# Patient Record
Sex: MALE | Race: WHITE | NOT HISPANIC OR LATINO | Employment: FULL TIME | URBAN - METROPOLITAN AREA
[De-identification: names, ages, dates, MRNs, and addresses within clinical notes are randomized per-mention and may not be internally consistent; named-entity substitution may affect disease eponyms.]

---

## 2017-04-10 ENCOUNTER — OFFICE VISIT (OUTPATIENT)
Dept: OPTOMETRY | Facility: CLINIC | Age: 57
End: 2017-04-10
Payer: COMMERCIAL

## 2017-04-10 DIAGNOSIS — Z96.1 PSEUDOPHAKIA OF LEFT EYE: ICD-10-CM

## 2017-04-10 DIAGNOSIS — H52.4 PRESBYOPIA: ICD-10-CM

## 2017-04-10 DIAGNOSIS — H52.203 ASTIGMATISM OF BOTH EYES: Primary | ICD-10-CM

## 2017-04-10 DIAGNOSIS — Z98.890 HX OF LASIK: ICD-10-CM

## 2017-04-10 DIAGNOSIS — H04.123 DRY EYES: ICD-10-CM

## 2017-04-10 PROCEDURE — 92015 DETERMINE REFRACTIVE STATE: CPT | Performed by: OPTOMETRIST

## 2017-04-10 PROCEDURE — 92014 COMPRE OPH EXAM EST PT 1/>: CPT | Performed by: OPTOMETRIST

## 2017-04-10 ASSESSMENT — REFRACTION_MANIFEST
OD_CYLINDER: +1.25
OD_AXIS: 005
OS_SPHERE: -2.50
OS_CYLINDER: +1.25
OD_CYLINDER: +1.25
METHOD_AUTOREFRACTION: 1
OS_CYLINDER: +1.00
OD_ADD: +2.00
OS_AXIS: 160
OS_ADD: +2.00
OS_AXIS: 170
OD_SPHERE: -1.00
OD_AXIS: 005
OS_SPHERE: -2.75
OD_SPHERE: -0.75

## 2017-04-10 ASSESSMENT — TONOMETRY
IOP_METHOD: APPLANATION
OD_IOP_MMHG: 14
OS_IOP_MMHG: 16

## 2017-04-10 ASSESSMENT — REFRACTION_WEARINGRX
SPECS_TYPE: SVL
OS_CYLINDER: +1.00
OS_SPHERE: -2.75
OD_SPHERE: -0.50
OD_AXIS: 023
OS_AXIS: 152
OD_CYLINDER: +0.75

## 2017-04-10 ASSESSMENT — VISUAL ACUITY
OS_SC: 20/100
OS_SC: 20/20
METHOD: SNELLEN - LINEAR
OD_SC+: +2
OD_SC: 20/50-1
OD_SC: 20/30

## 2017-04-10 ASSESSMENT — EXTERNAL EXAM - RIGHT EYE: OD_EXAM: PROLAPSED FAT PADS: UPPER

## 2017-04-10 ASSESSMENT — CUP TO DISC RATIO
OS_RATIO: 0.25
OD_RATIO: 0.2

## 2017-04-10 ASSESSMENT — EXTERNAL EXAM - LEFT EYE: OS_EXAM: PROLAPSED FAT PADS: UPPER

## 2017-04-10 ASSESSMENT — SLIT LAMP EXAM - LIDS
COMMENTS: NORMAL
COMMENTS: NORMAL

## 2017-04-10 ASSESSMENT — CONF VISUAL FIELD
OS_NORMAL: 1
OD_NORMAL: 1

## 2017-04-10 NOTE — MR AVS SNAPSHOT
"              After Visit Summary   4/10/2017    Bossman Cherry    MRN: [id number]           Patient Information     Date Of Birth          1960        Visit Information        Provider Department      4/10/2017 4:30 PM Nanda Hutchinson, [facility]        Today's Diagnoses     Astigmatism of both eyes    -  1    Presbyopia        Pseudophakia of left eye        Hx of LASIK, right eye          Care Instructions    Patient was advised of today's exam findings.  Fill glasses prescription  Allow 2 weeks to adapt to change in glasses  Use artificial tears twice a day Blink Tears , Systane Ultra or Refresh Optive   Return in 1 year for eye exam    Nanda Hutchinson O.D.  [facility]   [address] [place] [address]  [phone]          Follow-ups after your visit        Who to contact     If you have questions or need follow up information about today's clinic visit or your schedule please contact [facility] directly at [phone].  Normal or non-critical lab and imaging results will be communicated to you by [employer], letter or phone within 4 business days after the clinic has received the results. If you do not hear from us within 7 days, please contact the clinic through [employer] or phone. If you have a critical or abnormal lab result, we will notify you by phone as soon as possible.  Submit refill requests through cVidya or call your pharmacy and they will forward the refill request to us. Please allow 3 business days for your refill to be completed.          Additional Information About Your Visit        Trading [employer] Information     cVidya lets you send messages to your doctor, view your test results, renew your prescriptions, schedule appointments and more. To sign up, go to [email or web address] . Click on \"Log in\" on the left side of the screen, which will take you to the Welcome page. Then click on \"Sign up Now\" on the right side of the page.     You will be " asked to enter the access code listed below, as well as some personal information. Please follow the directions to create your username and password.     Your access code is: [email or web address]  Expires: 2017  5:12 PM     Your access code will  in 90 days. If you need help or a new code, please call your [facility] clinic or [phone].        Care EveryWhere ID     This is your Care EveryWhere ID. This could be used by other organizations to access your [facility] medical records  [id number]         Blood Pressure from Last 3 Encounters:   05/18/15 129/85    Weight from Last 3 Encounters:   No data found for Wt              Today, you had the following     No orders found for display       Primary Care Provider    Md Other Clinic                Thank you!     [facility] you for [facility]  [facility]. Our goal is always to provide you with excellent care. Hearing back from our patients is one way we can continue to improve our services. Please take a few minutes to complete the written survey that you may receive in the mail after your visit with us. Thank you!             Your Updated Medication List - Protect others around you: Learn how to safely use, store and throw away your medicines at www.disposemymeds.org.          This list is accurate as of: 4/10/17  5:20 PM.  Always use your most recent med list.                   Brand Name Dispense Instructions for use    aspirin 81 MG chewable tablet      Take 81 mg by mouth daily. Takes 1 daily       ibuprofen 200 MG capsule      Take 200 mg by mouth every 4 hours as needed. Takes 6 daily       LISINOPRIL PO      Take 5 mg by mouth daily       MULTIVITAMIN/IRON PO      Take  by mouth. Takes 1 daily

## 2017-04-10 NOTE — PROGRESS NOTES
Chief Complaint   Patient presents with     COMPREHENSIVE EYE EXAM         Last Eye Exam: 06/29/2015  Dilated Previously: Yes    What are you currently using to see?  Distance only glasses       Distance Vision Acuity: Satisfied with vision    Near Vision Acuity: Not satisfied when wearing current glasses, can't see computer     Eye Comfort: watery  Do you use eye drops? : No  Occupation or Hobbies: Driving     Ad Infuse  4/10/2017          Medical, surgical and family histories reviewed and updated 4/10/2017.     Had LASIK right eye for monovision, then implant left eye for near- noticed needs clarice bright day to see well, harder to see on hazy days    OBJECTIVE: See Ophthalmology exam    ASSESSMENT:    ICD-10-CM    1. Astigmatism of both eyes H52.203 EYE EXAM (SIMPLE-NONBILLABLE)     REFRACTION   2. Presbyopia H52.4 EYE EXAM (SIMPLE-NONBILLABLE)     REFRACTION   3. Pseudophakia of left eye Z96.1 EYE EXAM (SIMPLE-NONBILLABLE)     REFRACTION   4. Hx of LASIK, right eye Z98.890 EYE EXAM (SIMPLE-NONBILLABLE)     REFRACTION   5. Dry eyes H04.123 EYE EXAM (SIMPLE-NONBILLABLE)     REFRACTION     polyethylene glycol 400 (BLINK TEARS) 0.25 % SOLN ophthalmic solution      PLAN:     Patient Instructions   Patient was advised of today's exam findings.  Fill glasses prescription  Allow 2 weeks to adapt to change in glasses  Use artificial tears twice a day Blink Tears , Systane Ultra or Refresh Optive   Return in 1 year for eye exam    Nanda Hutchinson O.D.  [facility]   [address] [place] [address] [phone]

## 2017-04-10 NOTE — PATIENT INSTRUCTIONS
Patient was advised of today's exam findings.  Fill glasses prescription  Allow 2 weeks to adapt to change in glasses  Use artificial tears twice a day Blink Tears , Systane Ultra or Refresh Optive   Return in 1 year for eye exam    Nanda Hutchinson O.D.  [facility]   [address] [place] [address] [phone]

## 2019-01-02 ENCOUNTER — DOCUMENTATION ONLY (OUTPATIENT)
Dept: OPHTHALMOLOGY | Facility: CLINIC | Age: 59
End: 2019-01-02

## 2019-06-25 ENCOUNTER — OFFICE VISIT (OUTPATIENT)
Dept: OPHTHALMOLOGY | Facility: CLINIC | Age: 59
End: 2019-06-25
Payer: COMMERCIAL

## 2019-06-25 DIAGNOSIS — H52.223 REGULAR ASTIGMATISM OF BOTH EYES: ICD-10-CM

## 2019-06-25 DIAGNOSIS — H25.11 NUCLEAR SCLEROTIC CATARACT OF RIGHT EYE: ICD-10-CM

## 2019-06-25 DIAGNOSIS — H52.12 MYOPIA OF LEFT EYE: ICD-10-CM

## 2019-06-25 DIAGNOSIS — H52.4 PRESBYOPIA: ICD-10-CM

## 2019-06-25 DIAGNOSIS — Z96.1 PSEUDOPHAKIA OF LEFT EYE: Primary | ICD-10-CM

## 2019-06-25 DIAGNOSIS — H26.492 LEFT POSTERIOR CAPSULAR OPACIFICATION: ICD-10-CM

## 2019-06-25 DIAGNOSIS — Z98.890 HX OF LASIK: ICD-10-CM

## 2019-06-25 PROCEDURE — 92004 COMPRE OPH EXAM NEW PT 1/>: CPT | Performed by: STUDENT IN AN ORGANIZED HEALTH CARE EDUCATION/TRAINING PROGRAM

## 2019-06-25 PROCEDURE — 92015 DETERMINE REFRACTIVE STATE: CPT | Performed by: STUDENT IN AN ORGANIZED HEALTH CARE EDUCATION/TRAINING PROGRAM

## 2019-06-25 ASSESSMENT — REFRACTION_MANIFEST
OD_AXIS: 003
OS_ADD: +2.50
OD_SPHERE: -0.50
OS_AXIS: 158
OS_CYLINDER: +1.00
OS_SPHERE: -2.50
OD_ADD: +2.50
OD_CYLINDER: +1.00

## 2019-06-25 ASSESSMENT — VISUAL ACUITY
OD_SC: 20/40
OS_SC: 20/125
CORRECTION_TYPE: GLASSES
METHOD: SNELLEN - LINEAR
OS_PH_SC+: -1
OS_CC+: -2
OD_SC+: -1
OS_PH_SC: 20/30
OD_PH_SC: 20/25
OD_CC: 20/40
OD_CC+: -1
OS_CC: 20/30
OD_PH_SC+: -2

## 2019-06-25 ASSESSMENT — EXTERNAL EXAM - RIGHT EYE: OD_EXAM: PROLAPSED FAT PADS: UPPER

## 2019-06-25 ASSESSMENT — REFRACTION_WEARINGRX
OD_AXIS: 023
OS_SPHERE: -2.75
OS_AXIS: 152
OD_CYLINDER: +0.75
SPECS_TYPE: SVL
OS_CYLINDER: +1.00
OD_SPHERE: -0.50

## 2019-06-25 ASSESSMENT — TONOMETRY
OD_IOP_MMHG: 15
IOP_METHOD: APPLANATION
OS_IOP_MMHG: 17

## 2019-06-25 ASSESSMENT — CONF VISUAL FIELD
METHOD: COUNTING FINGERS
OD_NORMAL: 1
OS_NORMAL: 1

## 2019-06-25 ASSESSMENT — SLIT LAMP EXAM - LIDS
COMMENTS: 2+ DERMATOCHALASIS
COMMENTS: 2+ DERMATOCHALASIS

## 2019-06-25 ASSESSMENT — CUP TO DISC RATIO
OD_RATIO: 0.2
OS_RATIO: 0.25

## 2019-06-25 ASSESSMENT — EXTERNAL EXAM - LEFT EYE: OS_EXAM: PROLAPSED FAT PADS: UPPER

## 2019-06-25 NOTE — LETTER
6/25/2019         RE: Bossman Cherry  [address] [place] [id number]        Dear Colleague,    Thank you for referring your patient, Bossman Cherry, to the [facility]. Please see a copy of my visit note below.     Current Eye Medications:  none     Subjective:  Complete eye exam. Vision is OK both eyes, but having little trouble seeing small print in distance for last year. Trouble seeing ticker on bottom of TV. No eye pain or discomfort in either eye.      Objective:  See Ophthalmology Exam.      Assessment:  Bossman Cherry is a 59 year old male who presents with:   Encounter Diagnoses   Name Primary?     Pseudophakia of left eye       Posterior capsular opacification left eye Discussed likely need for YAG laser in the future.      Hx of LASIK, right eye      Nuclear sclerotic cataract of right eye Not Visually significant      Plan:  Glasses prescription given     Donny Ramos MD  [phone]               Again, thank you for allowing me to participate in the care of your patient.        Sincerely,        Donny Ramos MD

## 2021-02-25 ENCOUNTER — OFFICE VISIT (OUTPATIENT)
Dept: OPTOMETRY | Facility: CLINIC | Age: 61
End: 2021-02-25
Payer: COMMERCIAL

## 2021-02-25 DIAGNOSIS — H52.223 REGULAR ASTIGMATISM OF BOTH EYES: ICD-10-CM

## 2021-02-25 DIAGNOSIS — H25.811 MIXED TYPE AGE-RELATED CATARACT, RIGHT EYE: ICD-10-CM

## 2021-02-25 DIAGNOSIS — Z96.1 PSEUDOPHAKIA OF LEFT EYE: ICD-10-CM

## 2021-02-25 DIAGNOSIS — Z98.890 HX OF LASIK: ICD-10-CM

## 2021-02-25 DIAGNOSIS — E11.9 TYPE 2 DIABETES MELLITUS WITHOUT COMPLICATION, WITHOUT LONG-TERM CURRENT USE OF INSULIN (H): Primary | ICD-10-CM

## 2021-02-25 DIAGNOSIS — H52.4 PRESBYOPIA: ICD-10-CM

## 2021-02-25 PROCEDURE — 92015 DETERMINE REFRACTIVE STATE: CPT | Performed by: OPTOMETRIST

## 2021-02-25 PROCEDURE — 92004 COMPRE OPH EXAM NEW PT 1/>: CPT | Performed by: OPTOMETRIST

## 2021-02-25 RX ORDER — ATORVASTATIN CALCIUM 80 MG/1
TABLET, FILM COATED ORAL
COMMUNITY
Start: 2020-07-21

## 2021-02-25 RX ORDER — METHOCARBAMOL 750 MG/1
TABLET, FILM COATED ORAL
COMMUNITY

## 2021-02-25 RX ORDER — ORAL SEMAGLUTIDE 7 MG/1
TABLET ORAL
COMMUNITY

## 2021-02-25 RX ORDER — METFORMIN HCL 500 MG
TABLET, EXTENDED RELEASE 24 HR ORAL
COMMUNITY
Start: 2020-07-21

## 2021-02-25 ASSESSMENT — REFRACTION_MANIFEST
OS_CYLINDER: +1.00
OS_SPHERE: -2.50
OD_SPHERE: -0.50
OS_AXIS: 170
OD_AXIS: 015
OS_ADD: +1.50
OD_SPHERE: -2.25
OS_AXIS: 152
OD_AXIS: 175
METHOD_AUTOREFRACTION: 1
OS_CYLINDER: +1.00
OD_CYLINDER: +1.00
OD_ADD: +1.50
OD_CYLINDER: +1.75
OS_SPHERE: -3.25

## 2021-02-25 ASSESSMENT — REFRACTION_WEARINGRX
OS_SPHERE: -2.50
OS_CYLINDER: +1.00
OD_CYLINDER: +1.00
OS_AXIS: 158
OD_AXIS: 003
OD_SPHERE: -0.50

## 2021-02-25 ASSESSMENT — SLIT LAMP EXAM - LIDS
COMMENTS: DCH- EYELID SKIN RESTING ON EYELASHES
COMMENTS: DCH- EYELID SKIN RESTING ON EYELASHES

## 2021-02-25 ASSESSMENT — VISUAL ACUITY
OD_SC+: -1
OD_SC: 20/100
OS_SC: 20/100
OD_SC: 20/40
METHOD: SNELLEN - LINEAR
OS_SC: 20/20

## 2021-02-25 ASSESSMENT — EXTERNAL EXAM - LEFT EYE: OS_EXAM: PROLAPSED FAT PADS: UPPER

## 2021-02-25 ASSESSMENT — EXTERNAL EXAM - RIGHT EYE: OD_EXAM: PROLAPSED FAT PADS: UPPER

## 2021-02-25 ASSESSMENT — CUP TO DISC RATIO
OD_RATIO: 0.20
OS_RATIO: 0.25

## 2021-02-25 ASSESSMENT — TONOMETRY
OS_IOP_MMHG: 14
OD_IOP_MMHG: 14
IOP_METHOD: APPLANATION

## 2021-02-25 ASSESSMENT — CONF VISUAL FIELD
OD_NORMAL: 1
OS_NORMAL: 1

## 2021-02-25 NOTE — LETTER
2/25/2021         RE: Bossman Cherry  [address] [place] [address]        Dear Colleague,    Thank you for referring your patient, Bossman Cherry, to the [facility]. No diabetic retinopathy was found at eye exam.     Again, thank you for allowing me to participate in the care of your patient.        Sincerely,        Nanda Hutchinson, OD

## 2021-02-25 NOTE — PROGRESS NOTES
Chief Complaint   Patient presents with     Diabetic Eye Exam     A1C  7.1 last week    Last Eye Exam: 6-  Dilated Previously: Yes    What are you currently using to see?  Glasses rx sunglasses    Distance Vision Acuity: Noticed gradual change in right eye    Near Vision Acuity: Satisfied with vision while reading  unaided    Eye Comfort: good  Do you use eye drops? : No  Occupation or Hobbies: [employer]     Medical, surgical and family histories reviewed and updated 2/25/2021.       OBJECTIVE: See Ophthalmology exam    ASSESSMENT:    ICD-10-CM    1. Regular astigmatism of both eyes  H52.223    2. Presbyopia  H52.4    3. Pseudophakia of left eye  Z96.1    4. Hx of LASIK, right eye  Z98.890       PLAN:    Bossman Cherry aware  eye exam results will be sent to [facility].  Patient Instructions   Patient was advised of today's exam findings.  Fill glasses prescription  Monitor mild cataracts in right eye   Keep blood sugar under good control  Return in 1 year for diabetic eye exam      Diabetes weakens the blood vessels all over the body, including the eyes. Damage to the blood vessels in the eyes can cause swelling or bleeding into part of the eye (called the retina). This is called diabetic retinopathy (CHIDI-tin-AH-puh-thee). If not treated, this disease can cause vision loss or blindness.   Symptoms may include blurred or distorted vision, but many people have no symptoms. It's important to see your eye doctor regularly to check for problems.   Early treatment and good control can help protect your vision. Here are the things you can do to help prevent vision loss:      1. Keep your blood sugar levels under tight control.      2. Bring high blood pressure under control.      3. No smoking.      4. Have yearly dilated eye exams.      Nanda Hutchinson O.D.  [facility]   [id number] [place] [id number] [phone]

## 2021-02-25 NOTE — PATIENT INSTRUCTIONS
Patient was advised of today's exam findings.  Fill glasses prescription  Monitor mild cataracts in right eye   Keep blood sugar under good control  Return in 1 year for diabetic eye exam      Diabetes weakens the blood vessels all over the body, including the eyes. Damage to the blood vessels in the eyes can cause swelling or bleeding into part of the eye (called the retina). This is called diabetic retinopathy (CHIDI-tin-AH-puh-thee). If not treated, this disease can cause vision loss or blindness.   Symptoms may include blurred or distorted vision, but many people have no symptoms. It's important to see your eye doctor regularly to check for problems.   Early treatment and good control can help protect your vision. Here are the things you can do to help prevent vision loss:      1. Keep your blood sugar levels under tight control.      2. Bring high blood pressure under control.      3. No smoking.      4. Have yearly dilated eye exams.      Nanda Hutchinson O.D.  [facility]   [address] [place], [place] [id number] [phone]

## 2021-05-18 ENCOUNTER — TELEPHONE (OUTPATIENT)
Dept: OPHTHALMOLOGY | Facility: CLINIC | Age: 61
End: 2021-05-18

## 2021-05-18 ENCOUNTER — OFFICE VISIT (OUTPATIENT)
Dept: OPHTHALMOLOGY | Facility: CLINIC | Age: 61
End: 2021-05-18
Payer: COMMERCIAL

## 2021-05-18 DIAGNOSIS — Z96.1 PSEUDOPHAKIA OF LEFT EYE: ICD-10-CM

## 2021-05-18 DIAGNOSIS — Z98.890 HX OF LASIK: ICD-10-CM

## 2021-05-18 DIAGNOSIS — H25.11 NUCLEAR SCLEROTIC CATARACT OF RIGHT EYE: Primary | ICD-10-CM

## 2021-05-18 DIAGNOSIS — H26.492 LEFT POSTERIOR CAPSULAR OPACIFICATION: ICD-10-CM

## 2021-05-18 PROCEDURE — 92014 COMPRE OPH EXAM EST PT 1/>: CPT | Performed by: STUDENT IN AN ORGANIZED HEALTH CARE EDUCATION/TRAINING PROGRAM

## 2021-05-18 ASSESSMENT — VISUAL ACUITY
OD_BAT_HIGH: 20/40
OD_SC: 20/30-+1
METHOD: SNELLEN - LINEAR
OS_PH_SC: 20/25
OS_SC: 20/80-1

## 2021-05-18 ASSESSMENT — TONOMETRY
OD_IOP_MMHG: 19
OS_IOP_MMHG: 18
IOP_METHOD: APPLANATION

## 2021-05-18 ASSESSMENT — EXTERNAL EXAM - LEFT EYE: OS_EXAM: PROLAPSED FAT PADS: UPPER

## 2021-05-18 ASSESSMENT — REFRACTION_MANIFEST
OD_CYLINDER: +1.50
OD_SPHERE: PLANO
OD_AXIS: 180

## 2021-05-18 ASSESSMENT — EXTERNAL EXAM - RIGHT EYE: OD_EXAM: PROLAPSED FAT PADS: UPPER

## 2021-05-18 ASSESSMENT — SLIT LAMP EXAM - LIDS
COMMENTS: DCH- EYELID SKIN RESTING ON EYELASHES
COMMENTS: DCH- EYELID SKIN RESTING ON EYELASHES

## 2021-05-18 ASSESSMENT — CUP TO DISC RATIO
OS_RATIO: 0.25
OD_RATIO: 0.20

## 2021-05-18 NOTE — LETTER
5/18/2021         RE: Bossman Cherry  [address] [facility] [address]        Dear Colleague,    Thank you for referring your patient, Bossman Cherry, to the [facility]. Please see a copy of my visit note below.     Current Eye Medications:       Subjective:  Cataract evaluation right eye   Pt reports that his vision in his right eye is getting worse.  Pt has hx of Lasik right eye and is diabetic. Pseudophakic left eye corrected for monovision.  A1C 5/18/21 was 7.5.     Objective:  See Ophthalmology Exam.       Assessment:  Bossman Cherry is a 61 year old male who presents with:   Encounter Diagnoses   Name Primary?     Nuclear sclerotic cataract of right eye Visually significant right eye. Dil 8. S/p LASIK with left eye near (monovision) - will plan to target right eye mostly distance. He attempted to get the records from [employer], but they did not have them.      Hx of LASIK, right eye      Pseudophakia of left eye Had surgery in 2015 at [facility] (with me). Did well last time during surgery.      Left posterior capsular opacification      Visually significant cataract that is interfering with daily activities of living. Plan for cataract extraction and intraocular lens implant right eye.  Risks, benefits, complications, and alternatives discussed with patient including possibility of limitations from coexistent eye disease and loss of vision. Target refraction and lens options discussed.  Patient understands and wishes to proceed with surgery.     Plan:  Offered cataract surgery of the left eye at [facility] or the [facility] [place]  We will call you to schedule if you wish to proceed    Donny Ramos MD  [phone]          Again, thank you for allowing me to participate in the care of your patient.        Sincerely,        Donny Ramos MD

## 2021-05-18 NOTE — PATIENT INSTRUCTIONS
Offered cataract surgery of the left eye at [facility] or the [facility]  We will call you to schedule if you wish to proceed    Donny Ramos MD  [phone]

## 2021-05-18 NOTE — PROGRESS NOTES
Current Eye Medications:       Subjective:  Cataract evaluation right eye   Pt reports that his vision in his right eye is getting worse.  Pt has hx of Lasik right eye and is diabetic. Pseudophakic left eye corrected for monovision.  A1C 5/18/21 was 7.5.     Objective:  See Ophthalmology Exam.       Assessment:  Bossman Cherry is a 61 year old male who presents with:   Encounter Diagnoses   Name Primary?     Nuclear sclerotic cataract of right eye Visually significant right eye. Dil 8. S/p LASIK with left eye near (monovision) - will plan to target right eye mostly distance. He attempted to get the records from [employer], but they did not have them.      Hx of LASIK, right eye      Pseudophakia of left eye Had surgery in 2015 at [facility] (with me). Did well last time during surgery.      Left posterior capsular opacification      Visually significant cataract that is interfering with daily activities of living. Plan for cataract extraction and intraocular lens implant right eye.  Risks, benefits, complications, and alternatives discussed with patient including possibility of limitations from coexistent eye disease and loss of vision. Target refraction and lens options discussed.  Patient understands and wishes to proceed with surgery.     Plan:  Offered cataract surgery of the left eye at [facility] or the [facility] and [facility] [place]  We will call you to schedule if you wish to proceed    Donny Ramos MD  [phone]

## 2021-05-18 NOTE — TELEPHONE ENCOUNTER
Reason for Call:  Other     Detailed comments: Patients former Dr. torres That did Lasik called and noted that they have no records of patients lasik surgery.     Phone Number Patient can be reached at: Home number on file [phone] (home)    Best Time: any    Can we leave a detailed message on this number? YES    Call taken on 5/18/2021 at 1:01 PM by Denice Coles

## 2021-05-28 ENCOUNTER — PREP FOR PROCEDURE (OUTPATIENT)
Dept: OPHTHALMOLOGY | Facility: CLINIC | Age: 61
End: 2021-05-28

## 2021-05-28 DIAGNOSIS — H25.811 COMBINED FORM OF AGE-RELATED CATARACT, RIGHT EYE: Primary | ICD-10-CM

## 2021-06-01 ENCOUNTER — TELEPHONE (OUTPATIENT)
Dept: OPHTHALMOLOGY | Facility: CLINIC | Age: 61
End: 2021-06-01

## 2021-06-01 NOTE — TELEPHONE ENCOUNTER
No answer no voicemail. Rang and eventually stopped. Will await call back.    Notes state 8-23-21 for Surgery day.

## 2021-06-28 PROBLEM — H25.811 COMBINED FORM OF AGE-RELATED CATARACT, RIGHT EYE: Status: ACTIVE | Noted: 2021-06-28

## 2021-06-28 NOTE — TELEPHONE ENCOUNTER
Type of surgery: right phacoemulsification,cataract, with intraocular lens implant (right)  CPT 89766   Nuclear sclerotic cataract of right eye  H25.11  Location of surgery: [facility]  Date and time of surgery: 8-23-21 9:30am  Surgeon: Dr Ramos  Pre-Op Appt Date: 8-6-21 & TBD  Post-Op Appt Date: 8-24-21, 8-31-21 & 9-21-21   Packet sent out: Yes  Pre-cert/Authorization completed: No prior auth needed per [employer] online site.  Date: 6-28-21    Alyse Rojas  Prior Authorization Dept  [phone]

## 2021-06-30 DIAGNOSIS — Z11.59 ENCOUNTER FOR SCREENING FOR OTHER VIRAL DISEASES: ICD-10-CM

## 2021-07-25 ENCOUNTER — HEALTH MAINTENANCE LETTER (OUTPATIENT)
Age: 61
End: 2021-07-25

## 2021-08-03 ENCOUNTER — TELEPHONE (OUTPATIENT)
Dept: OPHTHALMOLOGY | Facility: CLINIC | Age: 61
End: 2021-08-03

## 2021-08-03 NOTE — TELEPHONE ENCOUNTER
Patient is having cataract surgery at the end of August with Dr. Ramos. He is wanting a note indicating he can not work until after his surgery. Please return phone call to discuss options available. Phone: [phone].

## 2021-08-04 NOTE — TELEPHONE ENCOUNTER
Patient states right eye feels like piece of gravel is in eye off and on. happens when focusing on stuff. Gravel feeling last for about 20 minutes, if patient uses drops and rest eye it goes away. Patient states felt exact same thing when he had cataract in left eye. Patient was using old rx drops from cataract surgery 5/18/15, explained he is not to take those and throw them away. Told him to use the artificial tears four times a day as needed. Eye is feeing better today so far, just was a bad day yesterday. Is OK with waiting until Friday to see Dr. Ramos. Told to call if anything changes or gets worse.

## 2021-08-06 ENCOUNTER — OFFICE VISIT (OUTPATIENT)
Dept: OPHTHALMOLOGY | Facility: CLINIC | Age: 61
End: 2021-08-06
Payer: COMMERCIAL

## 2021-08-06 DIAGNOSIS — H26.492 LEFT POSTERIOR CAPSULAR OPACIFICATION: ICD-10-CM

## 2021-08-06 DIAGNOSIS — Z98.890 HX OF LASIK: ICD-10-CM

## 2021-08-06 DIAGNOSIS — H25.11 NUCLEAR SCLEROTIC CATARACT OF RIGHT EYE: Primary | ICD-10-CM

## 2021-08-06 DIAGNOSIS — Z96.1 PSEUDOPHAKIA OF LEFT EYE: ICD-10-CM

## 2021-08-06 PROCEDURE — 92012 INTRM OPH EXAM EST PATIENT: CPT | Performed by: STUDENT IN AN ORGANIZED HEALTH CARE EDUCATION/TRAINING PROGRAM

## 2021-08-06 PROCEDURE — 92136 OPHTHALMIC BIOMETRY: CPT | Performed by: STUDENT IN AN ORGANIZED HEALTH CARE EDUCATION/TRAINING PROGRAM

## 2021-08-06 RX ORDER — KETOROLAC TROMETHAMINE 5 MG/ML
1 SOLUTION OPHTHALMIC 4 TIMES DAILY
Qty: 5 ML | Refills: 0 | Status: SHIPPED | OUTPATIENT
Start: 2021-08-22 | End: 2022-08-18

## 2021-08-06 RX ORDER — MOXIFLOXACIN 5 MG/ML
1 SOLUTION/ DROPS OPHTHALMIC 4 TIMES DAILY
Qty: 3 ML | Refills: 0 | Status: SHIPPED | OUTPATIENT
Start: 2021-08-22 | End: 2022-08-18

## 2021-08-06 RX ORDER — PREDNISOLONE ACETATE 10 MG/ML
1 SUSPENSION/ DROPS OPHTHALMIC 4 TIMES DAILY
Qty: 10 ML | Refills: 0 | Status: SHIPPED | OUTPATIENT
Start: 2021-08-22 | End: 2022-08-18

## 2021-08-06 ASSESSMENT — SLIT LAMP EXAM - LIDS
COMMENTS: DCH- EYELID SKIN RESTING ON EYELASHES
COMMENTS: DCH- EYELID SKIN RESTING ON EYELASHES

## 2021-08-06 ASSESSMENT — VISUAL ACUITY
METHOD: SNELLEN - LINEAR
OD_CC: 20/60-1

## 2021-08-06 ASSESSMENT — EXTERNAL EXAM - LEFT EYE: OS_EXAM: PROLAPSED FAT PADS: UPPER

## 2021-08-06 ASSESSMENT — TONOMETRY
IOP_METHOD: APPLANATION
OD_IOP_MMHG: 19

## 2021-08-06 ASSESSMENT — EXTERNAL EXAM - RIGHT EYE: OD_EXAM: PROLAPSED FAT PADS: UPPER

## 2021-08-06 NOTE — PATIENT INSTRUCTIONS
PRE-OP CATARACT INSTRUCTIONS    ** 3 eye drops from the pharmacy at least 3 days before surgery.    *Use the following drops in the right eye 4 times on the day before surgery and once the morning of surgery:                                 Vigamox or Ofloxacin (tan cap)   Ketorolac (gray cap)   Prednisolone (white or pink cap)    *Please bring all your eyedrops to the surgery center.    *If taking more than one drop, wait five minutes between drops.    *No solid food or drink after midnight. Please take the starred medications (see attached sheets) with a small sip of water the morning of surgery.    *If you are diabetic, please do not take any diabetic medications the morning of surgery.    Donny Ramos MD  [phone]

## 2021-08-06 NOTE — PROGRESS NOTES
Current Eye Medications:       Subjective:  preop  kpe right eye   Scheduled on 8/23/21. History of LASIK right eye - unable to obtain past records.      Objective:  See Ophthalmology Exam.       Assessment:  Bossman Cherry is a 61 year old male who presents with:   Encounter Diagnoses   Name Primary?     Nuclear sclerotic cataract of right eye Cataract pre-op right eye. Dil 8. 3 drops. S/p LASIK with left eye near (monovision). Target right eye mostly distance.     Hx of LASIK, right eye      Pseudophakia of left eye      Left posterior capsular opacification        Plan:  PRE-OP CATARACT INSTRUCTIONS    ** 3 eye drops from the pharmacy at least 3 days before surgery.    *Use the following drops in the right eye 4 times on the day before surgery and once the morning of surgery:                                 Vigamox or Ofloxacin (tan cap)   Ketorolac (gray cap)   Prednisolone (white or pink cap)    *Please bring all your eyedrops to the surgery center.    *If taking more than one drop, wait five minutes between drops.    *No solid food or drink after midnight. Please take the starred medications (see attached sheets) with a small sip of water the morning of surgery.    *If you are diabetic, please do not take any diabetic medications the morning of surgery.    Donny Ramos MD  [phone]

## 2021-08-06 NOTE — LETTER
8/6/2021         RE: Bossman Cherry  [address] [place] [address]        Dear Colleague,    Thank you for referring your patient, Bossman Cherry, to the [facility]. Please see a copy of my visit note below.     Current Eye Medications:       Subjective:  preop  kpe right eye   Scheduled on 8/23/21. History of LASIK right eye - unable to obtain past records.      Objective:  See Ophthalmology Exam.       Assessment:  Bossman Cherry is a 61 year old male who presents with:   Encounter Diagnoses   Name Primary?     Nuclear sclerotic cataract of right eye Cataract pre-op right eye. Dil 8. 3 drops. S/p LASIK with left eye near (monovision). Target right eye mostly distance.     Hx of LASIK, right eye      Pseudophakia of left eye      Left posterior capsular opacification        Plan:  PRE-OP CATARACT INSTRUCTIONS    ** 3 eye drops from the pharmacy at least 3 days before surgery.    *Use the following drops in the right eye 4 times on the day before surgery and once the morning of surgery:                                 Vigamox or Ofloxacin (tan cap)   Ketorolac (gray cap)   Prednisolone (white or pink cap)    *Please bring all your eyedrops to the surgery center.    *If taking more than one drop, wait five minutes between drops.    *No solid food or drink after midnight. Please take the starred medications (see attached sheets) with a small sip of water the morning of surgery.    *If you are diabetic, please do not take any diabetic medications the morning of surgery.    Donny Ramos MD  [phone]          Again, thank you for allowing me to participate in the care of your patient.        Sincerely,        Donny Ramos MD

## 2021-08-20 ENCOUNTER — LAB (OUTPATIENT)
Dept: LAB | Facility: CLINIC | Age: 61
End: 2021-08-20
Payer: COMMERCIAL

## 2021-08-20 ENCOUNTER — ANESTHESIA EVENT (OUTPATIENT)
Dept: SURGERY | Facility: AMBULATORY SURGERY CENTER | Age: 61
End: 2021-08-20
Payer: COMMERCIAL

## 2021-08-20 DIAGNOSIS — Z11.59 ENCOUNTER FOR SCREENING FOR OTHER VIRAL DISEASES: ICD-10-CM

## 2021-08-20 PROCEDURE — U0003 INFECTIOUS AGENT DETECTION BY NUCLEIC ACID (DNA OR RNA); SEVERE ACUTE RESPIRATORY SYNDROME CORONAVIRUS 2 (SARS-COV-2) (CORONAVIRUS DISEASE [COVID-19]), AMPLIFIED PROBE TECHNIQUE, MAKING USE OF HIGH THROUGHPUT TECHNOLOGIES AS DESCRIBED BY CMS-2020-01-R: HCPCS

## 2021-08-20 PROCEDURE — U0005 INFEC AGEN DETEC AMPLI PROBE: HCPCS

## 2021-08-21 LAB — SARS-COV-2 RNA RESP QL NAA+PROBE: NEGATIVE

## 2021-08-23 ENCOUNTER — HOSPITAL ENCOUNTER (OUTPATIENT)
Facility: AMBULATORY SURGERY CENTER | Age: 61
End: 2021-08-23
Attending: STUDENT IN AN ORGANIZED HEALTH CARE EDUCATION/TRAINING PROGRAM | Admitting: STUDENT IN AN ORGANIZED HEALTH CARE EDUCATION/TRAINING PROGRAM
Payer: COMMERCIAL

## 2021-08-23 ENCOUNTER — ANESTHESIA (OUTPATIENT)
Dept: SURGERY | Facility: AMBULATORY SURGERY CENTER | Age: 61
End: 2021-08-23
Payer: COMMERCIAL

## 2021-08-23 VITALS
DIASTOLIC BLOOD PRESSURE: 70 MMHG | SYSTOLIC BLOOD PRESSURE: 112 MMHG | TEMPERATURE: 97.5 F | RESPIRATION RATE: 16 BRPM | OXYGEN SATURATION: 95 % | WEIGHT: 208 LBS

## 2021-08-23 DIAGNOSIS — H25.811 COMBINED FORM OF AGE-RELATED CATARACT, RIGHT EYE: ICD-10-CM

## 2021-08-23 LAB — GLUCOSE BLDC GLUCOMTR-MCNC: 172 MG/DL (ref 70–99)

## 2021-08-23 PROCEDURE — G8918 PT W/O PREOP ORDER IV AB PRO: HCPCS

## 2021-08-23 PROCEDURE — 66984 XCAPSL CTRC RMVL W/O ECP: CPT | Mod: RT

## 2021-08-23 PROCEDURE — G8907 PT DOC NO EVENTS ON DISCHARG: HCPCS

## 2021-08-23 PROCEDURE — 66984 XCAPSL CTRC RMVL W/O ECP: CPT | Mod: RT | Performed by: STUDENT IN AN ORGANIZED HEALTH CARE EDUCATION/TRAINING PROGRAM

## 2021-08-23 DEVICE — EYE IMP IOL AMO PCL TECNIS ZCB00 19.0: Type: IMPLANTABLE DEVICE | Site: EYE | Status: FUNCTIONAL

## 2021-08-23 RX ORDER — HYDRALAZINE HYDROCHLORIDE 20 MG/ML
2.5-5 INJECTION INTRAMUSCULAR; INTRAVENOUS EVERY 10 MIN PRN
Status: DISCONTINUED | OUTPATIENT
Start: 2021-08-23 | End: 2021-08-24 | Stop reason: HOSPADM

## 2021-08-23 RX ORDER — MEPERIDINE HYDROCHLORIDE 25 MG/ML
12.5 INJECTION INTRAMUSCULAR; INTRAVENOUS; SUBCUTANEOUS
Status: DISCONTINUED | OUTPATIENT
Start: 2021-08-23 | End: 2021-08-24 | Stop reason: HOSPADM

## 2021-08-23 RX ORDER — SODIUM CHLORIDE, SODIUM LACTATE, POTASSIUM CHLORIDE, CALCIUM CHLORIDE 600; 310; 30; 20 MG/100ML; MG/100ML; MG/100ML; MG/100ML
500 INJECTION, SOLUTION INTRAVENOUS CONTINUOUS
Status: DISCONTINUED | OUTPATIENT
Start: 2021-08-23 | End: 2021-08-24 | Stop reason: HOSPADM

## 2021-08-23 RX ORDER — ONDANSETRON 4 MG/1
4 TABLET, ORALLY DISINTEGRATING ORAL EVERY 30 MIN PRN
Status: DISCONTINUED | OUTPATIENT
Start: 2021-08-23 | End: 2021-08-24 | Stop reason: HOSPADM

## 2021-08-23 RX ORDER — PHENYLEPHRINE HYDROCHLORIDE 25 MG/ML
1 SOLUTION/ DROPS OPHTHALMIC
Status: COMPLETED | OUTPATIENT
Start: 2021-08-23 | End: 2021-08-23

## 2021-08-23 RX ORDER — ONDANSETRON 2 MG/ML
4 INJECTION INTRAMUSCULAR; INTRAVENOUS EVERY 30 MIN PRN
Status: DISCONTINUED | OUTPATIENT
Start: 2021-08-23 | End: 2021-08-24 | Stop reason: HOSPADM

## 2021-08-23 RX ORDER — SODIUM CHLORIDE, SODIUM LACTATE, POTASSIUM CHLORIDE, CALCIUM CHLORIDE 600; 310; 30; 20 MG/100ML; MG/100ML; MG/100ML; MG/100ML
INJECTION, SOLUTION INTRAVENOUS CONTINUOUS
Status: DISCONTINUED | OUTPATIENT
Start: 2021-08-23 | End: 2021-08-24 | Stop reason: HOSPADM

## 2021-08-23 RX ORDER — TETRACAINE HYDROCHLORIDE 5 MG/ML
1-2 SOLUTION OPHTHALMIC ONCE
Status: COMPLETED | OUTPATIENT
Start: 2021-08-23 | End: 2021-08-23

## 2021-08-23 RX ORDER — LIDOCAINE 40 MG/G
CREAM TOPICAL
Status: DISCONTINUED | OUTPATIENT
Start: 2021-08-23 | End: 2021-08-24 | Stop reason: HOSPADM

## 2021-08-23 RX ORDER — ACETAMINOPHEN 325 MG/1
975 TABLET ORAL ONCE
Status: COMPLETED | OUTPATIENT
Start: 2021-08-23 | End: 2021-08-23

## 2021-08-23 RX ORDER — MOXIFLOXACIN 5 MG/ML
SOLUTION/ DROPS OPHTHALMIC PRN
Status: DISCONTINUED | OUTPATIENT
Start: 2021-08-23 | End: 2021-08-23 | Stop reason: HOSPADM

## 2021-08-23 RX ORDER — CYCLOPENTOLATE HYDROCHLORIDE 10 MG/ML
1 SOLUTION/ DROPS OPHTHALMIC
Status: COMPLETED | OUTPATIENT
Start: 2021-08-23 | End: 2021-08-23

## 2021-08-23 RX ORDER — METOPROLOL TARTRATE 1 MG/ML
1-2 INJECTION, SOLUTION INTRAVENOUS EVERY 5 MIN PRN
Status: DISCONTINUED | OUTPATIENT
Start: 2021-08-23 | End: 2021-08-24 | Stop reason: HOSPADM

## 2021-08-23 RX ORDER — TROPICAMIDE 10 MG/ML
1 SOLUTION/ DROPS OPHTHALMIC
Status: COMPLETED | OUTPATIENT
Start: 2021-08-23 | End: 2021-08-23

## 2021-08-23 RX ORDER — TETRACAINE HYDROCHLORIDE 5 MG/ML
SOLUTION OPHTHALMIC PRN
Status: DISCONTINUED | OUTPATIENT
Start: 2021-08-23 | End: 2021-08-23 | Stop reason: HOSPADM

## 2021-08-23 RX ORDER — ALBUTEROL SULFATE 0.83 MG/ML
2.5 SOLUTION RESPIRATORY (INHALATION) EVERY 4 HOURS PRN
Status: DISCONTINUED | OUTPATIENT
Start: 2021-08-23 | End: 2021-08-24 | Stop reason: HOSPADM

## 2021-08-23 RX ADMIN — CYCLOPENTOLATE HYDROCHLORIDE 1 DROP: 10 SOLUTION/ DROPS OPHTHALMIC at 08:52

## 2021-08-23 RX ADMIN — TROPICAMIDE 1 DROP: 10 SOLUTION/ DROPS OPHTHALMIC at 08:52

## 2021-08-23 RX ADMIN — CYCLOPENTOLATE HYDROCHLORIDE 1 DROP: 10 SOLUTION/ DROPS OPHTHALMIC at 09:00

## 2021-08-23 RX ADMIN — TETRACAINE HYDROCHLORIDE 2 DROP: 5 SOLUTION OPHTHALMIC at 08:44

## 2021-08-23 RX ADMIN — TROPICAMIDE 1 DROP: 10 SOLUTION/ DROPS OPHTHALMIC at 08:44

## 2021-08-23 RX ADMIN — SODIUM CHLORIDE, SODIUM LACTATE, POTASSIUM CHLORIDE, CALCIUM CHLORIDE 500 ML: 600; 310; 30; 20 INJECTION, SOLUTION INTRAVENOUS at 09:00

## 2021-08-23 RX ADMIN — PHENYLEPHRINE HYDROCHLORIDE 1 DROP: 25 SOLUTION/ DROPS OPHTHALMIC at 08:44

## 2021-08-23 RX ADMIN — CYCLOPENTOLATE HYDROCHLORIDE 1 DROP: 10 SOLUTION/ DROPS OPHTHALMIC at 08:44

## 2021-08-23 RX ADMIN — TROPICAMIDE 1 DROP: 10 SOLUTION/ DROPS OPHTHALMIC at 09:00

## 2021-08-23 RX ADMIN — PHENYLEPHRINE HYDROCHLORIDE 1 DROP: 25 SOLUTION/ DROPS OPHTHALMIC at 09:00

## 2021-08-23 RX ADMIN — Medication 4 MCG: at 09:58

## 2021-08-23 RX ADMIN — ACETAMINOPHEN 975 MG: 325 TABLET ORAL at 08:46

## 2021-08-23 RX ADMIN — Medication 8 MCG: at 09:51

## 2021-08-23 RX ADMIN — PHENYLEPHRINE HYDROCHLORIDE 1 DROP: 25 SOLUTION/ DROPS OPHTHALMIC at 08:52

## 2021-08-23 NOTE — ANESTHESIA PREPROCEDURE EVALUATION
Anesthesia Pre-Procedure Evaluation    Patient: Bossman Cherry   MRN: [id number] : 1960        Preoperative Diagnosis: Combined form of age-related cataract, right eye [H25.811]   Procedure : Procedure(s):  RIGHT PHACOEMULSIFICATION, CATARACT, WITH INTRAOCULAR LENS IMPLANT     Past Medical History:   Diagnosis Date     Cataract      Diabetes (H)      Hypertension       Past Surgical History:   Procedure Laterality Date     CATARACT IOL, RT/LT Left      LASIK      Right Eye Only     PHACOEMULSIFICATION WITH STANDARD INTRAOCULAR LENS IMPLANT Left 2015    Procedure: PHACOEMULSIFICATION WITH STANDARD INTRAOCULAR LENS IMPLANT;  Surgeon: Donny Ramos MD;  Location: [facility] OR      No Known Allergies   Social History     Tobacco Use     Smoking status: Never Smoker     Smokeless tobacco: Never Used     Tobacco comment: Lives in smoke free household   Substance Use Topics     Alcohol use: Not on file      Wt Readings from Last 1 Encounters:   21 94.3 kg (208 lb)        Anesthesia Evaluation   Pt has had prior anesthetic.     No history of anesthetic complications       ROS/MED HX  ENT/Pulmonary: Comment: cataract      Neurologic:  - neg neurologic ROS     Cardiovascular:     (+) hypertension-----    METS/Exercise Tolerance: >4 METS    Hematologic:  - neg hematologic  ROS     Musculoskeletal:  - neg musculoskeletal ROS     GI/Hepatic:  - neg GI/hepatic ROS     Renal/Genitourinary:  - neg Renal ROS     Endo:     (+) type II DM,     Psychiatric/Substance Use:  - neg psychiatric ROS     Infectious Disease:  - neg infectious disease ROS     Malignancy:  - neg malignancy ROS     Other:  - neg other ROS          Physical Exam    Airway  airway exam normal      Mallampati: I   TM distance: > 3 FB   Neck ROM: full   Mouth opening: > 3 cm    Respiratory Devices and Support         Dental  no notable dental history         Cardiovascular   cardiovascular exam normal       Rhythm and rate: regular and normal      Pulmonary   pulmonary exam normal        breath sounds clear to auscultation           OUTSIDE LABS:  CBC: No results found for: WBC, HGB, HCT, PLT  BMP:   Lab Results   Component Value Date     (H) 08/23/2021     COAGS: No results found for: PTT, INR, FIBR  POC: No results found for: BGM, HCG, HCGS  HEPATIC: No results found for: ALBUMIN, PROTTOTAL, ALT, AST, GGT, ALKPHOS, BILITOTAL, BILIDIRECT, THI  OTHER: No results found for: PH, LACT, A1C, OZ, PHOS, MAG, LIPASE, AMYLASE, TSH, T4, T3, CRP, SED    Anesthesia Plan    ASA Status:  2   NPO Status:  NPO Appropriate    Anesthesia Type: MAC.     - Reason for MAC: straight local not clinically adequate   Induction: N/a.   Maintenance: N/A.        Consents    Anesthesia Plan(s) and associated risks, benefits, and realistic alternatives discussed. Questions answered and patient/representative(s) expressed understanding.     - Discussed with:  Patient    Use of blood products discussed: No .     Postoperative Care    Pain management: Multi-modal analgesia.   PONV prophylaxis: Ondansetron (or other 5HT-3)     Comments:         H&P reviewed: Unable to attach H&P to encounter due to EHR limitations. H&P Update: appropriate H&P reviewed, patient examined. No interval changes since H&P (within 30 days).         Fili Carballo DO

## 2021-08-23 NOTE — OP NOTE
PreOp Diagnosis: Visually significant nuclear sclerotic cataract right eye  PostOp Diagnosis: Same  Surgeon: Donny Ramos MD  Implant: Tecnis [id number] 19.0D    Procedures:   1. Review of intraocular lens calculations, both eyes   2. Phacoemulsification and extraction of lens  right eye   3. Intraocular lens implantation right eye  Anesthesia: MAC/topical  Complications: None  EBL: <1cc    Bossman Cehrry suffers from a visually significant cataract of the right eye. This has caused problems with distance and reading vision, including glare. After discussing the risks, benefits, and alternatives, the patient wishes to proceed with cataract surgery.    The patient was identified in the pre-op area where the right eye was marked. The patient was then brought to the operating room where a time out was called, identifying the patient, the procedure, and the correct site. Tetracaine drops were applied to the operative eye. The operative eye was then prepped and draped in the usual sterile ophthalmic fashion. An eyelid speculum was placed into the operative eye. Additional tetracaine drops were applied. A paracentesis was made superior with a side port blade. 1% preservative-free lidocaine and epinephrine was injected into the anterior chamber.  Endocoat was injected to deepen the anterior chamber. A 2.4mm clear corneal wound was created with a keratome blade temporally.  A continuous curvilinear capsulorrhexis was started with a bent cystitome and completed with Utrada forceps. Hydrodissection of the lens nucleus was performed with BSS on a cannula. The lens nucleus was rotated. Phacoemulsification of the lens nucleus was accomplished in a phacoemulsification stop and chop technique. Remaining cortex was removed with irrigation and aspiration. The lens capsule was noted to be intact. Healon was used to inflate the capsular bag.  The lens was injected into the capsular bag. Remaining viscoelastic was removed with  irrigation and aspiration. The wounds were checked and found to be watertight after hydration. The eyelid speculum was removed and Vigamox drops were placed into the operative eye. The patient tolerated the procedure well and was in stable condition on the way to the recovery area.     Donny Ramos MD

## 2021-08-23 NOTE — ANESTHESIA CARE TRANSFER NOTE
Patient: Bossman Cherry    Procedure(s):  RIGHT PHACOEMULSIFICATION, CATARACT, WITH INTRAOCULAR LENS IMPLANT    Diagnosis: Combined form of age-related cataract, right eye [H25.811]  Diagnosis Additional Information: No value filed.    Anesthesia Type:   MAC     Note:    Oropharynx: oropharynx clear of all foreign objects and spontaneously breathing  Level of Consciousness: drowsy  Oxygen Supplementation: face mask    Independent Airway: airway patency satisfactory and stable  Dentition: dentition unchanged  Vital Signs Stable: post-procedure vital signs reviewed and stable  Report to RN Given: handoff report given  Patient transferred to: Phase II    Handoff Report: Identifed the Patient, Identified the Reponsible Provider, Reviewed the pertinent medical history, Discussed the surgical course, Reviewed Intra-OP anesthesia mangement and issues during anesthesia, Set expectations for post-procedure period and Allowed opportunity for questions and acknowledgement of understanding      Vitals:  Vitals Value Taken Time   /77 08/23/21 1028   Temp 97.5  F (36.4  C) 08/23/21 1028   Pulse     Resp 16 08/23/21 1028   SpO2 95 % 08/23/21 1028       Electronically Signed By: MURPHY Cross CRNA  August 23, 2021  10:29 AM

## 2021-08-23 NOTE — DISCHARGE INSTRUCTIONS
CATARACT SURGERY POST-OP INSTRUCTIONS  Dr. Donny Ramos  [phone]        Start using all three eye drops today, including   - Vigamox (tan top)   - Ketolorac (grey top)   - Prednisolone (white or pink top)    You should get 3 doses in today and 4 doses daily starting tomorrow.  Wait a few minutes in between putting each drop in.      Keep the eye shield taped in place unless putting drops in. We will remove it for you in the office tomorrow.      Light sensitivity may be noticed. Sunglasses may be worn for comfort.      Do not rub the operated eye.      Keep the operated eye dry. You may wash your hair, bathe or shower, but keep the operated eye closed while doing so.       No swimming, hot tub, or sauna for 2 weeks.      No make up around eye for 5 days.      No bending at the waist or lifting more than 10 pounds for one week.      May take Tylenol (per directions on bottle) for mild pain.      Call the office at [phone] and ask to speak to the on-call ophthalmologist  if any of the following should occur:  o Any sudden vision changes  o Nausea or severe headache  o Increase in pain not controlled  o Or signs of infection (pus, increasing redness or tenderness)  [facility]   Adult Discharge Orders & Instructions     For 24 hours after surgery    1. Get plenty of rest.  A responsible adult must stay with you for at least 24 hours after you leave the hospital.   2. Do not drive or use heavy equipment.  If you have weakness or tingling, don't drive or use heavy equipment until this feeling goes away.  3. Do not drink alcohol.  4. Avoid strenuous or risky activities.  Ask for help when climbing stairs.   5. You may feel lightheaded.  IF so, sit for a few minutes before standing.  Have someone help you get up.   6. If you have nausea (feel sick to your stomach): Drink only clear liquids such as apple juice, ginger ale, broth or 7-Up.  Rest may also help.  Be sure to drink enough fluids.  Move  to a regular diet as you feel able.  7. You may have a slight fever. Call the doctor if your fever is over 100 F (37.7 C) (taken under the tongue) or lasts longer than 24 hours.  8. You may have a dry mouth, a sore throat, muscle aches or trouble sleeping.  These should go away after 24 hours.  9. Do not make important or legal decisions.     Call your doctor for any of the followin.  Signs of infection (fever, growing tenderness at the surgery site, a large amount of drainage or bleeding, severe pain, foul-smelling drainage, redness, swelling).    2. It has been over 8 to 10 hours since surgery and you are still not able to urinate (pass water).    3.  Headache for over 24 hours.    4.  Numbness, tingling or weakness the day after surgery (if you had spinal anesthesia).                  5. Signs of Covid-19 infection (temperature over 100 degrees, shortness of breath, cough, loss of taste/smell, generalized body aches, persistent headache,                  chills, sore throat, nausea/vomiting/diarrhea).    Call the office at [phone] and ask to speak to the on-call ophthalmologist

## 2021-08-23 NOTE — ANESTHESIA POSTPROCEDURE EVALUATION
Patient: Bossman Cherry    Procedure(s):  RIGHT PHACOEMULSIFICATION, CATARACT, WITH INTRAOCULAR LENS IMPLANT    Diagnosis:Combined form of age-related cataract, right eye [H25.811]  Diagnosis Additional Information: No value filed.    Anesthesia Type:  MAC    Note:  Disposition: Outpatient   Postop Pain Control: Uneventful            Sign Out: Well controlled pain   PONV: No   Neuro/Psych: Uneventful            Sign Out: Acceptable/Baseline neuro status   Airway/Respiratory: Uneventful            Sign Out: Acceptable/Baseline resp. status   CV/Hemodynamics: Uneventful            Sign Out: Acceptable CV status; No obvious hypovolemia; No obvious fluid overload   Other NRE: NONE   DID A NON-ROUTINE EVENT OCCUR? No           Last vitals:  Vitals Value Taken Time   /70 08/23/21 1040   Temp 97.5  F (36.4  C) 08/23/21 1028   Pulse     Resp 16 08/23/21 1040   SpO2 95 % 08/23/21 1040       Electronically Signed By: Fili Carballo DO  August 23, 2021  11:04 AM

## 2021-08-24 ENCOUNTER — OFFICE VISIT (OUTPATIENT)
Dept: OPHTHALMOLOGY | Facility: CLINIC | Age: 61
End: 2021-08-24
Payer: COMMERCIAL

## 2021-08-24 DIAGNOSIS — Z96.1 PSEUDOPHAKIA OF LEFT EYE: Primary | ICD-10-CM

## 2021-08-24 DIAGNOSIS — Z98.890 HX OF LASIK: ICD-10-CM

## 2021-08-24 PROCEDURE — 99024 POSTOP FOLLOW-UP VISIT: CPT | Performed by: STUDENT IN AN ORGANIZED HEALTH CARE EDUCATION/TRAINING PROGRAM

## 2021-08-24 ASSESSMENT — EXTERNAL EXAM - RIGHT EYE: OD_EXAM: PROLAPSED FAT PADS: UPPER

## 2021-08-24 ASSESSMENT — TONOMETRY
OD_IOP_MMHG: 20
IOP_METHOD: APPLANATION

## 2021-08-24 ASSESSMENT — SLIT LAMP EXAM - LIDS
COMMENTS: DCH- EYELID SKIN RESTING ON EYELASHES
COMMENTS: DCH- EYELID SKIN RESTING ON EYELASHES

## 2021-08-24 ASSESSMENT — EXTERNAL EXAM - LEFT EYE: OS_EXAM: PROLAPSED FAT PADS: UPPER

## 2021-08-24 ASSESSMENT — VISUAL ACUITY
OD_SC: 20/25+3
METHOD: SNELLEN - LINEAR

## 2021-08-24 NOTE — LETTER
8/24/2021         RE: Bossman Cherry  [address] [place] [facility] [address]        Dear Colleague,    Thank you for referring your patient, Bossman Cherry, to the [facility]. Please see a copy of my visit note below.     Current Eye Medications:  Vigamox, ketorolac and prednisolone four times a day right eye      Subjective:  PO 1 right eye   Pt reports that he is seeing well and this eye feels fine.     Objective:  See Ophthalmology Exam.      Assessment:  Bossman Cherry is a 61 year old male who presents with:   Encounter Diagnoses   Name Primary?     Pseudophakia of left eye - Both Eyes POD1 s/p CE/IOL right eye - doing well.     Hx of LASIK, right eye        Plan:  POST-OP CATARACT INSTRUCTIONS    *   Use the following drop(s) in the RIGHT EYE four times a day:        continue Vigamox or ofloxacin (tan top), ketorolac (grey top) and prednisolone (white or pink top) four times a day until the bottles run out.    *   Wear eye shield when sleeping for one week. Do not rub the operated eye.     *   No bending or lifting more than 10 pounds for one week.    *   Keep water out of eye for two weeks.    *   OK to resume aspirin and/or other blood thinners if you stopped.     *   Return as scheduled in about one week.    *   If your vision worsens, eye becomes increasingly red, or becomes painful, call [phone].     Donny Ramos M.D.               Again, thank you for allowing me to participate in the care of your patient.        Sincerely,        Donny Ramos MD

## 2021-08-24 NOTE — PATIENT INSTRUCTIONS
POST-OP CATARACT INSTRUCTIONS    *   Use the following drop(s) in the RIGHT EYE four times a day:        continue Vigamox or ofloxacin (tan top), ketorolac (grey top) and prednisolone (white or pink top) four times a day until the bottles run out.    *   Wear eye shield when sleeping for one week. Do not rub the operated eye.     *   No bending or lifting more than 10 pounds for one week.    *   Keep water out of eye for two weeks.    *   OK to resume aspirin and/or other blood thinners if you stopped.     *   Return as scheduled in about one week.    *   If your vision worsens, eye becomes increasingly red, or becomes painful, call [phone].     Donny Ramos M.D.

## 2021-08-24 NOTE — LETTER
August 24, 2021      Bossman Cherry  [address] [place] [address]      To Whom It May Concern:    Bossman Cherry  was seen on 8/24/2021. He had cataract surgery on the right eye on 8/23/21 and has the following restrictions until 8/30/21: not bending over (keeping his head above heart) and not lifting more than 10 pounds. Please excuse him  until 8/30/21 due to surgery and restrictions.        Sincerely,        Donny Ramos MD

## 2021-08-24 NOTE — PROGRESS NOTES
Current Eye Medications:  Vigamox, ketorolac and prednisolone four times a day right eye      Subjective:  PO 1 right eye   Pt reports that he is seeing well and this eye feels fine.     Objective:  See Ophthalmology Exam.      Assessment:  Bossman Cherry is a 61 year old male who presents with:   Encounter Diagnoses   Name Primary?     Pseudophakia of left eye - Both Eyes POD1 s/p CE/IOL right eye - doing well.     Hx of LASIK, right eye        Plan:  POST-OP CATARACT INSTRUCTIONS    *   Use the following drop(s) in the RIGHT EYE four times a day:        continue Vigamox or ofloxacin (tan top), ketorolac (grey top) and prednisolone (white or pink top) four times a day until the bottles run out.    *   Wear eye shield when sleeping for one week. Do not rub the operated eye.     *   No bending or lifting more than 10 pounds for one week.    *   Keep water out of eye for two weeks.    *   OK to resume aspirin and/or other blood thinners if you stopped.     *   Return as scheduled in about one week.    *   If your vision worsens, eye becomes increasingly red, or becomes painful, call [phone].     Donny Ramos M.D.

## 2021-08-31 ENCOUNTER — OFFICE VISIT (OUTPATIENT)
Dept: OPHTHALMOLOGY | Facility: CLINIC | Age: 61
End: 2021-08-31
Payer: COMMERCIAL

## 2021-08-31 DIAGNOSIS — Z96.1 PSEUDOPHAKIA OF BOTH EYES: Primary | ICD-10-CM

## 2021-08-31 DIAGNOSIS — Z98.890 HX OF LASIK: ICD-10-CM

## 2021-08-31 PROCEDURE — 99024 POSTOP FOLLOW-UP VISIT: CPT | Performed by: STUDENT IN AN ORGANIZED HEALTH CARE EDUCATION/TRAINING PROGRAM

## 2021-08-31 ASSESSMENT — TONOMETRY
OS_IOP_MMHG: 16
IOP_METHOD: APPLANATION
OD_IOP_MMHG: 15

## 2021-08-31 ASSESSMENT — VISUAL ACUITY
METHOD: SNELLEN - LINEAR
OD_SC: J16
OS_SC: 20/150
OD_SC: 20/20
OS_PH_SC: 20/30

## 2021-08-31 ASSESSMENT — REFRACTION_MANIFEST
OD_AXIS: 013
OD_ADD: +2.50
OD_CYLINDER: +0.25
OD_SPHERE: PLANO

## 2021-08-31 ASSESSMENT — EXTERNAL EXAM - LEFT EYE: OS_EXAM: PROLAPSED FAT PADS: UPPER

## 2021-08-31 ASSESSMENT — EXTERNAL EXAM - RIGHT EYE: OD_EXAM: PROLAPSED FAT PADS: UPPER

## 2021-08-31 ASSESSMENT — SLIT LAMP EXAM - LIDS
COMMENTS: DCH- EYELID SKIN RESTING ON EYELASHES
COMMENTS: DCH- EYELID SKIN RESTING ON EYELASHES

## 2021-08-31 NOTE — PROGRESS NOTES
Current Eye Medications:  Ketoralac right eye QID  Moxifloxacin right eye QID  Prednisolone right eye QID     Subjective:  1 week post op cataract surgery right eye. No eye pain or discomfort. Vision improved with surgery.      Objective:  See Ophthalmology Exam.       Assessment:  Bossman Cherry is a 61 year old male who presents with:   Encounter Diagnoses   Name Primary?     Pseudophakia of both eyes POW1 s/p CE/IOL right eye - doing well.      Hx of LASIK, right eye        Plan:  *   For the right eye, continue Vigamox or ofloxacin (tan top), ketorolac (grey top) and prednisolone (white or pink top) four times a day until the bottles run out.    *   Stop wearing eye shield.    *   No eye rubbing. May resume heavy lifting.    *   Return in about one month for final exam with glasses check (as scheduled).    *   If your vision worsens, eye becomes increasingly red, or becomes painful, call [phone].    Donny Ramos M.D.

## 2021-08-31 NOTE — PATIENT INSTRUCTIONS
*   For the right eye, continue Vigamox or ofloxacin (tan top), ketorolac (grey top) and prednisolone (white or pink top) four times a day until the bottles run out.    *   Stop wearing eye shield.    *   No eye rubbing. May resume heavy lifting.    *   Return in about one month for final exam with glasses check (as scheduled).    *   If your vision worsens, eye becomes increasingly red, or becomes painful, call [phone].    Donny Ramos M.D.

## 2021-08-31 NOTE — LETTER
8/31/2021         RE: Bossman Cherry  [address] [place] [address]        Dear Colleague,    Thank you for referring your patient, Bossman Cherry, to the [facility]. Please see a copy of my visit note below.     Current Eye Medications:  Ketoralac right eye QID  Moxifloxacin right eye QID  Prednisolone right eye QID     Subjective:  1 week post op cataract surgery right eye. No eye pain or discomfort. Vision improved with surgery.      Objective:  See Ophthalmology Exam.       Assessment:  Bossman Cherry is a 61 year old male who presents with:   Encounter Diagnoses   Name Primary?     Pseudophakia of both eyes POW1 s/p CE/IOL right eye - doing well.      Hx of LASIK, right eye        Plan:  *   For the right eye, continue Vigamox or ofloxacin (tan top), ketorolac (grey top) and prednisolone (white or pink top) four times a day until the bottles run out.    *   Stop wearing eye shield.    *   No eye rubbing. May resume heavy lifting.    *   Return in about one month for final exam with glasses check (as scheduled).    *   If your vision worsens, eye becomes increasingly red, or becomes painful, call [phone].    Donny Ramos M.D.        Again, thank you for allowing me to participate in the care of your patient.        Sincerely,        Donny Ramos MD

## 2021-09-19 ENCOUNTER — HEALTH MAINTENANCE LETTER (OUTPATIENT)
Age: 61
End: 2021-09-19

## 2021-09-21 ENCOUNTER — OFFICE VISIT (OUTPATIENT)
Dept: OPHTHALMOLOGY | Facility: CLINIC | Age: 61
End: 2021-09-21
Payer: COMMERCIAL

## 2021-09-21 DIAGNOSIS — Z96.1 PSEUDOPHAKIA OF BOTH EYES: Primary | ICD-10-CM

## 2021-09-21 DIAGNOSIS — Z98.890 HX OF LASIK: ICD-10-CM

## 2021-09-21 PROCEDURE — 99024 POSTOP FOLLOW-UP VISIT: CPT | Performed by: STUDENT IN AN ORGANIZED HEALTH CARE EDUCATION/TRAINING PROGRAM

## 2021-09-21 ASSESSMENT — TONOMETRY
IOP_METHOD: APPLANATION
OD_IOP_MMHG: 15
OS_IOP_MMHG: 17

## 2021-09-21 ASSESSMENT — REFRACTION_MANIFEST
OS_SPHERE: -2.50
OS_CYLINDER: +1.00
OD_SPHERE: -0.25
OD_ADD: +2.50
OS_AXIS: 158
OD_CYLINDER: +0.50
OD_AXIS: 016
OS_ADD: +2.50

## 2021-09-21 ASSESSMENT — SLIT LAMP EXAM - LIDS
COMMENTS: DCH- EYELID SKIN RESTING ON EYELASHES
COMMENTS: DCH- EYELID SKIN RESTING ON EYELASHES

## 2021-09-21 ASSESSMENT — VISUAL ACUITY
OS_PH_SC: 20/25
OS_SC: J1+
METHOD: SNELLEN - LINEAR
OD_SC: 20/20
OD_SC: J16
OS_SC: 20/125
OS_PH_SC+: -1

## 2021-09-21 ASSESSMENT — EXTERNAL EXAM - LEFT EYE: OS_EXAM: PROLAPSED FAT PADS: UPPER

## 2021-09-21 ASSESSMENT — CUP TO DISC RATIO: OD_RATIO: 0.2

## 2021-09-21 ASSESSMENT — EXTERNAL EXAM - RIGHT EYE: OD_EXAM: PROLAPSED FAT PADS: UPPER

## 2021-09-21 NOTE — LETTER
9/21/2021         RE: Bossman Cherry  [address] [place] [address]        Dear Colleague,    Thank you for referring your patient, Bossman Cherry, to the [facility]. Please see a copy of my visit note below.     Current Eye Medications:  None. Finish post op drops 4 - 5 days ago. AT's prn.     Subjective:  Here for 1 month post op cataract surgery right eye (8/23/2021). No eye pain or discomfort. Vision is good at distance and near.      Objective:  See Ophthalmology Exam.      Assessment:  Bossman Cherry is a 61 year old male who presents with:   Encounter Diagnoses   Name Primary?     Pseudophakia of both eyes Final MR right eye - doing well.      Hx of LASIK, right eye        Plan:  Continue artificial tears up to four times a day as needed     Fill prescription for new glasses given or can use over the counter readers as needed     Return to clinic in 1 year for a complete eye exam or earlier if problems should arise.    Donny Ramos M.D.  [phone]               Again, thank you for allowing me to participate in the care of your patient.        Sincerely,        Donny Ramos MD

## 2021-09-21 NOTE — PROGRESS NOTES
Current Eye Medications:  None. Finish post op drops 4 - 5 days ago. AT's prn.     Subjective:  Here for 1 month post op cataract surgery right eye (8/23/2021). No eye pain or discomfort. Vision is good at distance and near.      Objective:  See Ophthalmology Exam.      Assessment:  Bossman Cherry is a 61 year old male who presents with:   Encounter Diagnoses   Name Primary?     Pseudophakia of both eyes Final MR right eye - doing well.      Hx of LASIK, right eye        Plan:  Continue artificial tears up to four times a day as needed     Fill prescription for new glasses given or can use over the counter readers as needed     Return to clinic in 1 year for a complete eye exam or earlier if problems should arise.    Donny Ramos M.D.  [phone]

## 2021-09-21 NOTE — PATIENT INSTRUCTIONS
Continue artificial tears up to four times a day as needed     Fill prescription for new glasses given or can use over the counter readers as needed     Return to clinic in 1 year for a complete eye exam or earlier if problems should arise.    Donny Ramos M.D.  [phone]

## 2021-11-14 ENCOUNTER — HEALTH MAINTENANCE LETTER (OUTPATIENT)
Age: 61
End: 2021-11-14

## 2022-03-06 ENCOUNTER — HEALTH MAINTENANCE LETTER (OUTPATIENT)
Age: 62
End: 2022-03-06

## 2022-06-26 ENCOUNTER — HEALTH MAINTENANCE LETTER (OUTPATIENT)
Age: 62
End: 2022-06-26

## 2022-08-18 ENCOUNTER — OFFICE VISIT (OUTPATIENT)
Dept: OPTOMETRY | Facility: CLINIC | Age: 62
End: 2022-08-18
Payer: COMMERCIAL

## 2022-08-18 DIAGNOSIS — H02.052 TRICHIASIS OF RIGHT LOWER EYELID: ICD-10-CM

## 2022-08-18 DIAGNOSIS — H52.223 REGULAR ASTIGMATISM OF BOTH EYES: ICD-10-CM

## 2022-08-18 DIAGNOSIS — H02.054 TRICHIASIS OF LEFT UPPER EYELID: ICD-10-CM

## 2022-08-18 DIAGNOSIS — Z01.01 VISION EXAM WITH ABNORMAL FINDINGS: Primary | ICD-10-CM

## 2022-08-18 DIAGNOSIS — Z96.1 PSEUDOPHAKIA OF BOTH EYES: ICD-10-CM

## 2022-08-18 DIAGNOSIS — E11.9 TYPE 2 DIABETES MELLITUS WITHOUT COMPLICATION, WITHOUT LONG-TERM CURRENT USE OF INSULIN (H): ICD-10-CM

## 2022-08-18 DIAGNOSIS — H25.811 MIXED TYPE AGE-RELATED CATARACT, RIGHT EYE: ICD-10-CM

## 2022-08-18 DIAGNOSIS — H52.4 PRESBYOPIA: ICD-10-CM

## 2022-08-18 PROCEDURE — 92015 DETERMINE REFRACTIVE STATE: CPT | Performed by: OPTOMETRIST

## 2022-08-18 PROCEDURE — 67820 REVISE EYELASHES: CPT | Performed by: OPTOMETRIST

## 2022-08-18 PROCEDURE — 92014 COMPRE OPH EXAM EST PT 1/>: CPT | Mod: 25 | Performed by: OPTOMETRIST

## 2022-08-18 ASSESSMENT — VISUAL ACUITY
OD_SC: 20/40
OS_SC: 20/20 -1
OD_SC+: -1
OD_SC: 20/20
OS_SC: 20/150
METHOD: SNELLEN - LINEAR

## 2022-08-18 ASSESSMENT — REFRACTION_WEARINGRX
OS_CYLINDER: +1.00
OD_SPHERE: -0.50
OS_SPHERE: -2.50
SPECS_TYPE: SVL
OD_CYLINDER: +1.00
OS_AXIS: 173
OD_AXIS: 015

## 2022-08-18 ASSESSMENT — REFRACTION_MANIFEST
OS_CYLINDER: +1.25
OD_SPHERE: -0.25
OD_ADD: +2.00
OD_SPHERE: -0.50
OD_AXIS: 178
OD_CYLINDER: +0.50
OS_AXIS: 160
OS_SPHERE: -3.25
OS_AXIS: 152
OD_CYLINDER: +0.25
OD_AXIS: 015
METHOD_AUTOREFRACTION: 1
OS_CYLINDER: +1.25
OS_SPHERE: -2.75
OS_ADD: +2.00

## 2022-08-18 ASSESSMENT — TONOMETRY
IOP_METHOD: APPLANATION
OD_IOP_MMHG: 12
OS_IOP_MMHG: 14

## 2022-08-18 ASSESSMENT — CUP TO DISC RATIO
OS_RATIO: 0.2
OD_RATIO: 0.2

## 2022-08-18 ASSESSMENT — CONF VISUAL FIELD
OD_NORMAL: 1
OS_NORMAL: 1

## 2022-08-18 NOTE — LETTER
8/18/2022         RE: Bossman Cherry  [address] [place] [address]        Dear Colleague,    Thank you for referring your patient, Bossman Cherry, to the [facility].  No diabetic retinopathy was found at eye exam.    Again, thank you for allowing me to participate in the care of your patient.        Sincerely,        Nanda Hutchinson, OD

## 2022-08-18 NOTE — PROGRESS NOTES
Chief Complaint   Patient presents with     Diabetic Eye Exam        Chief Complaint(s) and History of Present Illness(es)     Diabetic Eye Exam     Vision: is stable    Diabetes Type: Type 2 and taking oral medications               7/11/22  [facility] A1C 7.6         Last Eye Exam: 9/21/21  Dilated Previously: Yes    What are you currently using to see?  Glasses, he only brought his sunglasses today. He does not wear his glasses all of the time     Noticed some blur with prescription  sunglasses     Distance Vision Acuity: Satisfied with monovision most of the time    Near Vision Acuity: Satisfied with vision while reading unaided except rarely he will have an issue in low light     Eye Comfort: he feels like there is a gritty feeling in both eyes but more in the right eye all of the time and it is worse when he moves his eyes aroung  Do you use eye drops? : Yes: he has tried allergy eye drops and pills for his discomfort but states it did not help. Moisturizing drops help the most but it is very temporary. He is using the OTC moisture drop up to 20 times a day  Occupation or Hobbies: does a lot of driving and spends a lot of time outdoors. 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       OBJECTIVE: See Ophthalmology exam    removed lashes per patient request    ASSESSMENT:    ICD-10-CM    1. Vision exam with abnormal findings  Z01.01 EYE EXAM (SIMPLE-NONBILLABLE)     REFRACTION     EPILATION, FORCEPS   2. Regular astigmatism of both eyes  H52.223 EYE EXAM (SIMPLE-NONBILLABLE)     REFRACTION     EPILATION, FORCEPS   3. Presbyopia  H52.4 EYE EXAM (SIMPLE-NONBILLABLE)     REFRACTION     EPILATION, FORCEPS   4. Mixed type age-related cataract, right eye  H25.811 EYE EXAM (SIMPLE-NONBILLABLE)     REFRACTION     EPILATION, FORCEPS   5. Trichiasis of right lower eyelid   H02.052 EYE EXAM (SIMPLE-NONBILLABLE)     REFRACTION     EPILATION, FORCEPS   6. Trichiasis of left upper eyelid  H02.054 EYE EXAM (SIMPLE-NONBILLABLE)     REFRACTION     EPILATION, FORCEPS   7. Type 2 diabetes mellitus without complication, without long-term current use of insulin (H)  E11.9 EYE EXAM (SIMPLE-NONBILLABLE)     REFRACTION     EPILATION, FORCEPS    no retinopathy found at eye exam    8. Pseudophakia of both eyes  Z96.1 EYE EXAM (SIMPLE-NONBILLABLE)     REFRACTION     EPILATION, FORCEPS       PLAN:    Bossman Cherry aware  eye exam results will be sent to [facility].  Patient Instructions   Fill glasses prescription  Use Systane Complete PF, Systane Hydration PF or Refresh Relieva PF as frequently as needed.       Keep blood sugar under good control  Return in 1 year for diabetic eye exam      Blood sugar and blood pressure control are very important in the prevention of ocular complications from diabetes.       Nanda Hutchinson, OD  [phone]

## 2022-08-18 NOTE — PATIENT INSTRUCTIONS
Fill glasses prescription  Use Systane Complete PF, Systane Hydration PF or Refresh Relieva PF as frequently as needed.       Keep blood sugar under good control  Return in 1 year for diabetic eye exam      Blood sugar and blood pressure control are very important in the prevention of ocular complications from diabetes.       Nanda Hutchinson, OD  [phone]

## 2022-08-21 ENCOUNTER — HEALTH MAINTENANCE LETTER (OUTPATIENT)
Age: 62
End: 2022-08-21

## 2022-11-20 ENCOUNTER — HEALTH MAINTENANCE LETTER (OUTPATIENT)
Age: 62
End: 2022-11-20

## 2023-04-15 ENCOUNTER — HEALTH MAINTENANCE LETTER (OUTPATIENT)
Age: 63
End: 2023-04-15

## 2023-09-16 ENCOUNTER — HEALTH MAINTENANCE LETTER (OUTPATIENT)
Age: 63
End: 2023-09-16

## 2023-12-20 ENCOUNTER — OFFICE VISIT (OUTPATIENT)
Dept: OPTOMETRY | Facility: CLINIC | Age: 63
End: 2023-12-20
Payer: COMMERCIAL

## 2023-12-20 DIAGNOSIS — Z96.1 PSEUDOPHAKIA OF BOTH EYES: ICD-10-CM

## 2023-12-20 DIAGNOSIS — H52.223 REGULAR ASTIGMATISM OF BOTH EYES: ICD-10-CM

## 2023-12-20 DIAGNOSIS — E11.9 TYPE 2 DIABETES MELLITUS WITHOUT COMPLICATION, WITHOUT LONG-TERM CURRENT USE OF INSULIN (H): ICD-10-CM

## 2023-12-20 DIAGNOSIS — Z01.01 VISION EXAM WITH ABNORMAL FINDINGS: Primary | ICD-10-CM

## 2023-12-20 DIAGNOSIS — H52.12 MYOPIA, LEFT: ICD-10-CM

## 2023-12-20 DIAGNOSIS — H52.4 PRESBYOPIA: ICD-10-CM

## 2023-12-20 PROCEDURE — 92015 DETERMINE REFRACTIVE STATE: CPT | Performed by: OPTOMETRIST

## 2023-12-20 PROCEDURE — 92014 COMPRE OPH EXAM EST PT 1/>: CPT | Performed by: OPTOMETRIST

## 2023-12-20 RX ORDER — SEMAGLUTIDE 0.68 MG/ML
INJECTION, SOLUTION SUBCUTANEOUS
COMMUNITY
Start: 2023-12-18

## 2023-12-20 ASSESSMENT — CONF VISUAL FIELD
OS_NORMAL: 1
OD_INFERIOR_TEMPORAL_RESTRICTION: 0
METHOD: COUNTING FINGERS
OD_INFERIOR_NASAL_RESTRICTION: 0
OD_SUPERIOR_NASAL_RESTRICTION: 0
OD_NORMAL: 1
OS_INFERIOR_TEMPORAL_RESTRICTION: 0
OS_SUPERIOR_NASAL_RESTRICTION: 0
OS_INFERIOR_NASAL_RESTRICTION: 0
OD_SUPERIOR_TEMPORAL_RESTRICTION: 0
OS_SUPERIOR_TEMPORAL_RESTRICTION: 0

## 2023-12-20 ASSESSMENT — KERATOMETRY
OS_K2POWER_DIOPTERS: 45.50
OD_K2POWER_DIOPTERS: 44.75
OD_AXISANGLE2_DEGREES: 158
OS_K1POWER_DIOPTERS: 45.75
OS_AXISANGLE2_DEGREES: 140
OD_K1POWER_DIOPTERS: 44.50

## 2023-12-20 ASSESSMENT — TONOMETRY
OS_IOP_MMHG: 12
OD_IOP_MMHG: 9
IOP_METHOD: APPLANATION

## 2023-12-20 ASSESSMENT — REFRACTION_MANIFEST
OD_SPHERE: -0.50
OD_ADD: +2.25
OD_SPHERE: +0.25
OS_AXIS: 155
OS_CYLINDER: +0.75
OD_CYLINDER: +0.25
OS_CYLINDER: +1.00
OS_SPHERE: -3.00
OD_AXIS: 171
OD_CYLINDER: +1.00
OS_AXIS: 148
OD_AXIS: 010
OS_ADD: +2.25
OS_SPHERE: -3.25

## 2023-12-20 ASSESSMENT — REFRACTION_WEARINGRX
SPECS_TYPE: SVL DISTANCE
OD_CYLINDER: +0.75
OS_SPHERE: -2.50
OS_CYLINDER: +1.00
OD_AXIS: 013
OS_AXIS: 165
OD_SPHERE: -0.25

## 2023-12-20 ASSESSMENT — SLIT LAMP EXAM - LIDS
COMMENTS: NORMAL
COMMENTS: NORMAL

## 2023-12-20 ASSESSMENT — VISUAL ACUITY
OD_CC+: -1
OD_SC: 20/100
OD_CC: 20/25
OS_SC: 20/20-1
METHOD: SNELLEN - LINEAR
OS_CC: 20/20
OS_CC+: -2
CORRECTION_TYPE: GLASSES

## 2023-12-20 ASSESSMENT — CUP TO DISC RATIO
OS_RATIO: 0.2
OD_RATIO: 0.2

## 2023-12-20 NOTE — PATIENT INSTRUCTIONS
Fill glasses prescription  Allow 2 weeks to adapt to change in glasses  Keep blood sugar under good control  Return in 1 year for diabetic eye exam      Blood sugar and blood pressure control are very important in the prevention of ocular complications from diabetes.       Nanda Hutchinson, OD  [phone]

## 2023-12-20 NOTE — PROGRESS NOTES
Chief Complaint   Patient presents with    Diabetic Eye Exam     Ozempic injections weekly         Chief Complaint(s) and History of Present Illness(es)       Diabetic Eye Exam              Vision: is stable    Diabetes Type: Type 2    Blood Sugars: is controlled    Comments: Ozempic injections weekly                    !2-19-23  7.4 no change since last time it was measured          Last Eye Exam: 8/18/22  Dilated Previously: Yes    What are you currently using to see?  Glasses, uses the sunglass Rx mostly    Distance Vision Acuity: Satisfied with vision    Near Vision Acuity: Satisfied with vision while reading and using computer unaided. Said that there may be a very slight change     Eye Comfort: good  Do you use eye drops? : Yes: As needed   Occupation or Hobbies: [employer]      Medical, surgical and family histories reviewed and updated 12/20/2023.       OBJECTIVE: See Ophthalmology exam    ASSESSMENT:    ICD-10-CM    1. Vision exam with abnormal findings  Z01.01       2. Type 2 diabetes mellitus without complication, without long-term current use of insulin (H)  E11.9       3. Pseudophakia of both eyes  Z96.1       4. Regular astigmatism of both eyes  H52.223       5. Myopia, left  H52.12       6. Presbyopia  H52.4           PLAN:    Bossman Cherry aware  eye exam results will be sent to [facility].  Patient Instructions   Fill glasses prescription  Allow 2 weeks to adapt to change in glasses  Keep blood sugar under good control  Return in 1 year for diabetic eye exam      Blood sugar and blood pressure control are very important in the prevention of ocular complications from diabetes.       Nanda Hutchinson, OD  [phone]

## 2023-12-20 NOTE — LETTER
12/20/2023         RE: Bossman Cherry  [address] [facility] [address]        Dear Colleague,    Thank you for referring your patient, Bossman Cherry, to the [facility].  No diabetic retinopathy was found at eye exam.  Please see a copy of my visit note below.    Chief Complaint   Patient presents with     Diabetic Eye Exam     Ozempic injections weekly         Chief Complaint(s) and History of Present Illness(es)       Diabetic Eye Exam              Vision: is stable    Diabetes Type: Type 2    Blood Sugars: is controlled    Comments: Ozempic injections weekly                    !2-19-23  7.4 no change since last time it was measured          Last Eye Exam: 8/18/22  Dilated Previously: Yes    What are you currently using to see?  Glasses, uses the sunglass Rx mostly    Distance Vision Acuity: Satisfied with vision    Near Vision Acuity: Satisfied with vision while reading and using computer unaided. Said that there may be a very slight change     Eye Comfort: good  Do you use eye drops? : Yes: As needed   Occupation or Hobbies: [employer]      Medical, surgical and family histories reviewed and updated 12/20/2023.       OBJECTIVE: See Ophthalmology exam    ASSESSMENT:    ICD-10-CM    1. Vision exam with abnormal findings  Z01.01       2. Type 2 diabetes mellitus without complication, without long-term current use of insulin (H)  E11.9       3. Pseudophakia of both eyes  Z96.1       4. Regular astigmatism of both eyes  H52.223       5. Myopia, left  H52.12       6. Presbyopia  H52.4           PLAN:    Bossman Cherry aware  eye exam results will be sent to Cisco Calderon.  Patient Instructions   Fill glasses prescription  Allow 2 weeks to adapt to change in glasses  Keep blood sugar under good control  Return in 1 year for diabetic eye exam      Blood sugar and blood pressure control are very important in the prevention of ocular  complications from diabetes.       Nanda Hutchinson, KAY  [phone]                      Again, thank you for allowing me to participate in the care of your patient.        Sincerely,        Nanda Hutchinson, OD

## 2024-02-03 ENCOUNTER — HEALTH MAINTENANCE LETTER (OUTPATIENT)
Age: 64
End: 2024-02-03

## 2024-06-22 ENCOUNTER — HEALTH MAINTENANCE LETTER (OUTPATIENT)
Age: 64
End: 2024-06-22

## 2024-11-09 ENCOUNTER — HEALTH MAINTENANCE LETTER (OUTPATIENT)
Age: 64
End: 2024-11-09

## 2025-03-02 ENCOUNTER — HEALTH MAINTENANCE LETTER (OUTPATIENT)
Age: 65
End: 2025-03-02

## 2025-06-19 ENCOUNTER — OFFICE VISIT (OUTPATIENT)
Dept: OPTOMETRY | Facility: CLINIC | Age: 65
End: 2025-06-19
Payer: COMMERCIAL

## 2025-06-19 DIAGNOSIS — Z96.1 PSEUDOPHAKIA OF BOTH EYES: ICD-10-CM

## 2025-06-19 DIAGNOSIS — E11.9 TYPE 2 DIABETES MELLITUS WITHOUT COMPLICATION, WITHOUT LONG-TERM CURRENT USE OF INSULIN (H): ICD-10-CM

## 2025-06-19 DIAGNOSIS — H52.12 MYOPIA, LEFT: ICD-10-CM

## 2025-06-19 DIAGNOSIS — H52.223 REGULAR ASTIGMATISM OF BOTH EYES: ICD-10-CM

## 2025-06-19 DIAGNOSIS — H52.4 PRESBYOPIA: ICD-10-CM

## 2025-06-19 DIAGNOSIS — Z01.01 VISION EXAM WITH ABNORMAL FINDINGS: Primary | ICD-10-CM

## 2025-06-19 ASSESSMENT — VISUAL ACUITY
OS_SC: 20/200
OS_SC: 20/20
OD_SC: 20/20
OD_SC: 20/70 +2
METHOD: SNELLEN - LINEAR

## 2025-06-19 ASSESSMENT — CONF VISUAL FIELD
OD_SUPERIOR_TEMPORAL_RESTRICTION: 0
METHOD: COUNTING FINGERS
OS_INFERIOR_NASAL_RESTRICTION: 0
OS_SUPERIOR_NASAL_RESTRICTION: 0
OD_INFERIOR_TEMPORAL_RESTRICTION: 0
OD_INFERIOR_NASAL_RESTRICTION: 0
OS_NORMAL: 1
OD_SUPERIOR_NASAL_RESTRICTION: 0
OS_SUPERIOR_TEMPORAL_RESTRICTION: 0
OD_NORMAL: 1
OS_INFERIOR_TEMPORAL_RESTRICTION: 0

## 2025-06-19 ASSESSMENT — KERATOMETRY
OS_AXISANGLE_DEGREES: 048
OS_K1POWER_DIOPTERS: 45.75
OS_K2POWER_DIOPTERS: 45.25
OS_AXISANGLE2_DEGREES: 138
OD_K2POWER_DIOPTERS: 44.25
OD_AXISANGLE2_DEGREES: 012
OD_K1POWER_DIOPTERS: 44.50
OD_AXISANGLE_DEGREES: 102

## 2025-06-19 ASSESSMENT — REFRACTION_WEARINGRX
OD_SPHERE: +0.25
OD_CYLINDER: +0.25
OD_AXIS: 010
OS_CYLINDER: +1.00
OD_AXIS: 010
OD_ADD: +2.25
OS_SPHERE: -3.25
OD_CYLINDER: +0.25
OS_ADD: +2.25
OS_CYLINDER: +1.00
OD_SPHERE: +0.25
OS_SPHERE: -3.25
OS_AXIS: 155
SPECS_TYPE: SVL DISTANCE
SPECS_TYPE: PAL
OS_AXIS: 155

## 2025-06-19 ASSESSMENT — CUP TO DISC RATIO
OS_RATIO: 0.2
OD_RATIO: 0.2

## 2025-06-19 ASSESSMENT — EXTERNAL EXAM - RIGHT EYE: OD_EXAM: NORMAL

## 2025-06-19 ASSESSMENT — REFRACTION_MANIFEST
OD_SPHERE: -0.25
OD_AXIS: 178
OD_CYLINDER: +0.75
OS_CYLINDER: +1.50
METHOD_AUTOREFRACTION: 1
OD_AXIS: 010
OS_CYLINDER: +1.50
OD_CYLINDER: +0.25
OS_AXIS: 144
OS_SPHERE: -3.25
OD_ADD: +2.50
OS_AXIS: 140
OS_SPHERE: -3.75
OD_SPHERE: PLANO
OS_ADD: +2.50

## 2025-06-19 ASSESSMENT — EXTERNAL EXAM - LEFT EYE: OS_EXAM: NORMAL

## 2025-06-19 ASSESSMENT — SLIT LAMP EXAM - LIDS
COMMENTS: 1+ DERMATOCHALASIS
COMMENTS: 1+ DERMATOCHALASIS

## 2025-06-19 ASSESSMENT — TONOMETRY
OD_IOP_MMHG: 11
IOP_METHOD: APPLANATION
OS_IOP_MMHG: 11

## 2025-06-19 NOTE — PROGRESS NOTES
Chief Complaint   Patient presents with    Diabetic Eye Exam        Chief Complaint(s) and History of Present Illness(es)       Diabetic Eye Exam              Diabetes Type: Type 2 and taking oral medications (Ozempic once a week injection)    Duration: years    Blood Sugars: is controlled                   3-10-25  A1C 6.3 [facility] , primary care provider Cisco Calderon CNP    Has right shoulder issues        Last Eye Exam: 12/20/2023  Dilated Previously: Yes    What are you currently using to see?  Glasses SVL for driving at night, not with today    Distance Vision Acuity: Satisfied with vision    Near Vision Acuity: Satisfied with vision while reading and using computer unaided    Eye Comfort: good  Do you use eye drops? : No  Occupation or Hobbies: Retired    Cecy Case    Optometric Assistant       Medical, surgical and family histories reviewed and updated 6/19/2025.     Cataract surgery R 2021, L 2015   LASIK right eye 2002  No family history of macular degeneration or glaucoma     OBJECTIVE: See Ophthalmology exam    ASSESSMENT:    ICD-10-CM    1. Vision exam with abnormal findings  Z01.01       2. Type 2 diabetes mellitus without complication, without long-term current use of insulin (H)  E11.9       3. Pseudophakia of both eyes  Z96.1       4. Regular astigmatism of both eyes  H52.223       5. Myopia, left  H52.12       6. Presbyopia  H52.4           PLAN:    Bossman Cherry aware  eye exam results will be sent to Cisco Calderon.  Patient Instructions   Optional to fill new glasses prescription, minimal change  Keep blood sugar under good control  Return in 1 year for diabetic eye exam      Blood sugar and blood pressure control are very important in the prevention of ocular complications from diabetes.       Nanda Hutchinson, OD  [phone]

## 2025-06-19 NOTE — LETTER
6/19/2025      Bossman LARRY Jo Ann  [address] [place] [id number]      Dear Colleague,    Thank you for referring your patient, Bossman Cherry, to the [facility]. No diabetic retinopathy was found at eye exam. Please see a copy of my visit note below.    Chief Complaint   Patient presents with     Diabetic Eye Exam        Chief Complaint(s) and History of Present Illness(es)       Diabetic Eye Exam              Diabetes Type: Type 2 and taking oral medications (Ozempic once a week injection)    Duration: years    Blood Sugars: is controlled                   3-10-25  A1C 6.3 [facility] , primary care provider Cisco Calderon CNP    Has right shoulder issues        Last Eye Exam: 12/20/2023  Dilated Previously: Yes    What are you currently using to see?  Glasses SVL for driving at night, not with today    Distance Vision Acuity: Satisfied with vision    Near Vision Acuity: Satisfied with vision while reading and using computer unaided    Eye Comfort: good  Do you use eye drops? : No  Occupation or Hobbies: Retired    Cecy Case    Optometric Assistant       Medical, surgical and family histories reviewed and updated 6/19/2025.     Cataract surgery R 2021, L 2015   LASIK right eye 2002  No family history of macular degeneration or glaucoma     OBJECTIVE: See Ophthalmology exam    ASSESSMENT:    ICD-10-CM    1. Vision exam with abnormal findings  Z01.01       2. Type 2 diabetes mellitus without complication, without long-term current use of insulin (H)  E11.9       3. Pseudophakia of both eyes  Z96.1       4. Regular astigmatism of both eyes  H52.223       5. Myopia, left  H52.12       6. Presbyopia  H52.4           PLAN:    Bossman Cherry aware  eye exam results will be sent to Cisco Calderon.  Patient Instructions   Optional to fill new glasses prescription, minimal change  Keep blood sugar under good control  Return in 1 year for diabetic eye exam      Blood  sugar and blood pressure control are very important in the prevention of ocular complications from diabetes.       Nanda Hutchinson, OD  [phone]                     Again, thank you for allowing me to participate in the care of your patient.        Sincerely,        Nanda Hutchinson, OD    Electronically signed

## 2025-06-19 NOTE — PATIENT INSTRUCTIONS
Optional to fill new glasses prescription, minimal change  Keep blood sugar under good control  Return in 1 year for diabetic eye exam      Blood sugar and blood pressure control are very important in the prevention of ocular complications from diabetes.       Nanda Hutchinson, OD  [phone]

## 2025-06-21 ENCOUNTER — HEALTH MAINTENANCE LETTER (OUTPATIENT)
Age: 65
End: 2025-06-21

## 2025-07-12 ENCOUNTER — HEALTH MAINTENANCE LETTER (OUTPATIENT)
Age: 65
End: 2025-07-12

## (undated) DEVICE — EYE PACK CUSTOM CATARACT AS12127-01

## (undated) DEVICE — GLOVE PROTEXIS W/NEU-THERA 7.0  2D73TE70

## (undated) DEVICE — SU ETHILON 10-0 TG140-6 12" 9003G

## (undated) DEVICE — PREP POVIDONE IODINE SWABS X3

## (undated) DEVICE — SOL WATER IRRIG 1000ML BOTTLE 07139-09

## (undated) DEVICE — PREP CHLORAPREP 26ML TINTED ORANGE  260815

## (undated) RX ORDER — ACETAMINOPHEN 325 MG/1
TABLET ORAL
Status: DISPENSED
Start: 2021-08-23